# Patient Record
Sex: MALE | NOT HISPANIC OR LATINO | URBAN - METROPOLITAN AREA
[De-identification: names, ages, dates, MRNs, and addresses within clinical notes are randomized per-mention and may not be internally consistent; named-entity substitution may affect disease eponyms.]

---

## 2019-07-30 ENCOUNTER — APPOINTMENT (RX ONLY)
Dept: URBAN - METROPOLITAN AREA CLINIC 12 | Facility: CLINIC | Age: 24
Setting detail: DERMATOLOGY
End: 2019-07-30

## 2019-07-30 DIAGNOSIS — L738 OTHER SPECIFIED DISEASES OF HAIR AND HAIR FOLLICLES: ICD-10-CM

## 2019-07-30 DIAGNOSIS — L50.9 URTICARIA, UNSPECIFIED: ICD-10-CM

## 2019-07-30 DIAGNOSIS — L663 OTHER SPECIFIED DISEASES OF HAIR AND HAIR FOLLICLES: ICD-10-CM

## 2019-07-30 DIAGNOSIS — L73.9 FOLLICULAR DISORDER, UNSPECIFIED: ICD-10-CM

## 2019-07-30 PROBLEM — L02.424 FURUNCLE OF LEFT UPPER LIMB: Status: ACTIVE | Noted: 2019-07-30

## 2019-07-30 PROBLEM — L02.423 FURUNCLE OF RIGHT UPPER LIMB: Status: ACTIVE | Noted: 2019-07-30

## 2019-07-30 PROCEDURE — ? INVENTORY

## 2019-07-30 PROCEDURE — ? COUNSELING

## 2019-07-30 PROCEDURE — ? ADDITIONAL NOTES

## 2019-07-30 PROCEDURE — 99202 OFFICE O/P NEW SF 15 MIN: CPT

## 2019-07-30 PROCEDURE — ? ORDER TESTS

## 2019-07-30 PROCEDURE — ? DIAGNOSIS COMMENT

## 2019-07-30 ASSESSMENT — BSA RASH: BSA RASH: 20

## 2019-07-30 ASSESSMENT — LOCATION DETAILED DESCRIPTION DERM
LOCATION DETAILED: RIGHT DISTAL POSTERIOR UPPER ARM
LOCATION DETAILED: LEFT DISTAL POSTERIOR UPPER ARM

## 2019-07-30 ASSESSMENT — LOCATION ZONE DERM: LOCATION ZONE: ARM

## 2019-07-30 ASSESSMENT — LOCATION SIMPLE DESCRIPTION DERM
LOCATION SIMPLE: LEFT UPPER ARM
LOCATION SIMPLE: RIGHT UPPER ARM

## 2019-07-30 NOTE — PROCEDURE: ADDITIONAL NOTES
Additional Notes: Photo reveals urticarial plaques.  Onset 6/28/19. Multiple courses of prednisone. Pt flared when finished. LABS:  KEIRA negative, Ds KEIRA negative,Anti Sm negative, Lyme negativr, TsH wnl, C3 wnl, C4 elevated 40.6, ESR elevated 48 ( nl 15).  He is obtaining labs today:  C2, C3, C4, CH50 and C1 esterase.
Detail Level: Detailed
Additional Notes: No new meds, no illicit drug use.

## 2019-07-30 NOTE — HPI: RASH
What Type Of Note Output Would You Prefer (Optional)?: Bullet Format
How Severe Is Your Rash?: moderate
Is This A New Presentation, Or A Follow-Up?: Rash
Additional History: Declines chaperone \\nPatient went to and allergist  they gave him prednisone zytec singular . Finished dose of prednisone one week ago .